# Patient Record
Sex: FEMALE | Race: WHITE | URBAN - METROPOLITAN AREA
[De-identification: names, ages, dates, MRNs, and addresses within clinical notes are randomized per-mention and may not be internally consistent; named-entity substitution may affect disease eponyms.]

---

## 2018-01-23 ENCOUNTER — THERAPY VISIT (OUTPATIENT)
Dept: PHYSICAL THERAPY | Facility: CLINIC | Age: 73
End: 2018-01-23
Payer: COMMERCIAL

## 2018-01-23 DIAGNOSIS — M25.511 RIGHT SHOULDER PAIN, UNSPECIFIED CHRONICITY: Primary | ICD-10-CM

## 2018-01-23 DIAGNOSIS — M19.011 ARTHRITIS OF SHOULDER REGION, RIGHT: ICD-10-CM

## 2018-01-23 PROCEDURE — 97110 THERAPEUTIC EXERCISES: CPT | Mod: GP | Performed by: PHYSICAL THERAPIST

## 2018-01-23 PROCEDURE — 97161 PT EVAL LOW COMPLEX 20 MIN: CPT | Mod: GP | Performed by: PHYSICAL THERAPIST

## 2018-01-23 NOTE — PROGRESS NOTES
[facility] Initial Evaluation -- Upper Extremity    Evaluation Date: January 23, 2018  Layne Persaud is a 72 year old female with a Rt shld condition.   Referral: Ortho  Work mechanical stresses: retired  Employment status:   na  Leisure mechanical stresses: Is doing shld exer at home currently, usually walks but is not currently d/t shld pain  Functional disability score (SPADI): See flowsheet  VAS score (0-10): 5-6/10, ranges 3-10/10  Handedness (R/L):  Rt  Patient goals/expectations:  Progress exer for trip to [place]    HISTORY    Present symptoms: ant shld, Post shld, lateral arm, top of shld.    Pain quality (sharp/shooting/stabbing/aching/burning/cramping):  Sharp, stabbing, throbbing    Present since (onset date):  Summer 2016, exacerbation July 2017. MD referral 1-    Symptoms (improving/unchanging/worsening):  worsening.    Symptoms commenced as a result of: no apparent reason   Condition occurred in the following environment: unknown    Symptoms at onset: same as above  Paresthesia (yes/no):  no  Spinal history: none   Cough/Sneeze (pos/neg):  neg    Constant symptoms: Rt shld  Intermittent symptoms: Rt upper arm    Symptoms are worse with the following: Always Dressing, Always Reaching, Always Sleeping (prone/sup/side R/L) - side R/L and Time of day - Always AM, computer work - sometimes, Turning steering wheel to Lt and buckling seatbelt w/ Rt hand.  Symptoms are better with the following: Other - TNS and ice temporary    Continued use makes the pain (better/worse/no effect): worse    Disturbed night (yes/no):  Yes waking 3+ x/night    Pain at rest (yes/no): yes  Site (neck/shoulder/elbow/wrist/hand): Rt shld    Other questions (swelling/catching/clicking/locking/subluxing):  crepitus    Previous episodes: 2-3 yrs of shld pain  Previous treatments: PT - exer for shld Nov-early Jan 2018 - no change in pain    Specific Questions:  General health (excellent/good/fair/poor):   good  Pertinent medical history includes: Osteoarthritis, Overweight, High blood pressure, Hepatitis, Dizziness/Concussions and Chest pain  Medications (nil/NSAIDS/analg/steroids/anticoag/other):  OTC analgesic and Other - High blood pressure and cholesterol  Medical allergies:  Copper  Imaging (None/Xray/MRI/Other): CT - X-rays - no joint space   Recent or major surgery (yes/no): Rt THR, TKR, spinal fusion, hysterectomy  Night pain (yes/no): no  Accidents (yes/no): fell on [employer] in [facility] Aug 2017  Unexplained weight loss (yes/no): no  Barriers at home: none  Other red flags: none    Sites for physical examination (neck/shoulder/elbow/wrist/hand): Rt shld    EXAMINATION    Posture:  Sitting (good/fair/poor): fair  Correction of posture (better/worse/no effect/NA): NE  Standing (good/fair/poor): fair - incr upper thoracic Kyphosis, protruded head, and rounded shlds.  Other observations:      Neurological (NA/motor/sensory/reflexes/dural):     Baselines (pain or functional activity): Constant pain Rt shld    Extremities (Shoulder/Elbow/Wrist/Hand): Rt shld    Movement Loss Thomas Mod Min Nil Pain   Flexion   150  +   Extension   60     Abduction   142  +   Internal Rotation   To L1     External Rotation  35   +   Supination        Pronation        Radial Deviation        Ulnar Deviation           Passive Movement (+/- overpressure)/(PDM/ERP):  Na d/t time  Resisted Test Response (pain):    Shld Flexion, Abduction and ER 5-/5 (+)   Shld ER, IR, Extension 5/5. (+) w/ ER    Other Tests:     Spine:  Movement Loss: Cerv AROM is WNL except Ext 25% loss.  All movements painfree.  Effect of repeated movements:    Seated Cerv Retr x10 - NE, NE, NE Cerv or Shld ROM.   Seated Cerv Retr w/ OP x10 NE, NE, NE shld pain or cerv ROM, Incr shld ROM,    Seated cerv Ext:  x10 NE, NE, NE shld pain, Incr cerv ROM and Shld ROM.  Effect of static positioning: NA  Spine testing (not relevant/relevant/secondary problem):  Relevant???    Baseline Symptoms: NA  Repeated Tests Symptom Response Mechanical Response   Active/Passive movement, resisted test, functional test During - Produce, Abolish, Increase, Decrease, NE After -   Better, Worse, NB, NW, NE Effect -   ? or ? ROM, strength or key functional test No Effect          Effect of static positioning                  Provisional Classification (Extremity/Spine): Spine - Derangement - Asymmetrical, unilateral, symptoms above elbow      Principle of Management:  Education:  Posture - neutral spine, use of L-roll (pt has one at home from back surgery), affect of posture on neck and shld, centralisation vs peripheralisation.  Equipment provided:  none  Exercise and dosage:  Seated Cerv Retr w/ OP 10x every 2 hours (min 6 x/day) x 2-4 days, then progress to Seated Cerv Ext 10x 6 X/day.    ASSESSMENT/PLAN:    Patient is a 72 year old female with right side shoulder complaints.    Patient has the following significant findings with corresponding treatment plan.                Diagnosis 1:  Rt shld Arthritis/pain  Pain -  manual therapy, education, directional preference exercise and home program  Decreased ROM/flexibility - manual therapy, therapeutic exercise and home program  Decreased strength - therapeutic exercise, therapeutic activities and home program  Decreased function - therapeutic activities and home program  Impaired posture - neuro re-education and home program    Therapy Evaluation Codes:   1) History comprised of:   Personal factors that impact the plan of care:      None.    Comorbidity factors that impact the plan of care are:      High blood pressure, Osteoarthritis and Overweight.     Medications impacting care: High blood pressure, Pain and Cholesterol.  2) Examination of Body Systems comprised of:   Body structures and functions that impact the plan of care:      Cervical spine and Shoulder.   Activity limitations that impact the plan of care are:      Driving,  Dressing, Reading/Computer work and Sleeping.  3) Clinical presentation characteristics are:   Stable/Uncomplicated.  4) Decision-Making    Low complexity using standardized patient assessment instrument and/or measureable assessment of functional outcome.  Cumulative Therapy Evaluation is: Low complexity.    Previous and current functional limitations:  (See Goal Flow Sheet for this information)    Short term and Long term goals: (See Goal Flow Sheet for this information)     Communication ability:  Patient appears to be able to clearly communicate and understand verbal and written communication and follow directions correctly.  Treatment Explanation - The following has been discussed with the patient:   RX ordered/plan of care  Anticipated outcomes  Possible risks and side effects  This patient would benefit from PT intervention to resume normal activities.   Rehab potential is good.    Frequency:  2 X week, once daily  Duration:  for 6 weeks  Discharge Plan:  Achieve all LTG.  Independent in home treatment program.  Reach maximal therapeutic benefit.    Please refer to the daily flowsheet for treatment today, total treatment time and time spent performing 1:1 timed codes.

## 2018-01-23 NOTE — LETTER
[facility] PHYSICAL THERAPY  [address]  [facility] [address]  [phone]    2018    Re: Layne Persaud   :   1945  MRN:  [id number]   REFERRING PHYSICIAN:   Gail Lugo    [facility] PHYSICAL THERAPY    Date of Initial Evaluation:  2018  Visits:  1  Reason for Referral:     Right shoulder pain, unspecified chronicity  Arthritis of shoulder region, right    [facility] Initial Evaluation -- Upper Extremity  Evaluation Date: 2018  Layne Persaud is a 72 year old female with a Rt shld condition.   Referral: Ortho  Work mechanical stresses: retired  Employment status:   na  Leisure mechanical stresses: Is doing shld exer at home currently, usually walks but is not currently d/t shld pain  Functional disability score (SPADI): See flowsheet  VAS score (0-10): 5-6/10, ranges 3-10/10  Handedness (R/L):  Rt  Patient goals/expectations:  Progress exer for trip to [place]    HISTORY  Present symptoms: ant shld, Post shld, lateral arm, top of shld.    Pain quality (sharp/shooting/stabbing/aching/burning/cramping):  Sharp, stabbing, throbbing  Present since (onset date):  Summer 2016, exacerbation 2017. MD referral 2018    Symptoms (improving/unchanging/worsening):  worsening.  Symptoms commenced as a result of: no apparent reason   Condition occurred in the following environment: unknown  Symptoms at onset: same as above    Paresthesia (yes/no):  no  Spinal history: none     Cough/Sneeze (pos/neg):  neg  Constant symptoms: Rt shld  Intermittent symptoms: Rt upper arm  Symptoms are worse with the following: Always Dressing, Always Reaching, Always Sleeping (prone/sup/side R/L) - side R/L and Time of day - Always AM, computer work - sometimes, Turning steering wheel to Lt and buckling seatbelt w/ Rt hand.  Symptoms are better with the following: Other - TNS and ice  temporary  Continued use makes the pain (better/worse/no effect): worse  Disturbed night (yes/no):  Yes waking 3+ x/night    Re: Layne YARI Hung   :   1945    HISTORY (continued)  Pain at rest (yes/no): yes    Site (neck/shoulder/elbow/wrist/hand): Rt shld  Other questions (swelling/catching/clicking/locking/subluxing):  crepitus  Previous episodes: 2-3 yrs of shld pain  Previous treatments: PT - exer for shld Nov-early 2018 - no change in pain    Specific Questions:  General health (excellent/good/fair/poor):  good  Pertinent medical history includes: Osteoarthritis, Overweight, High blood pressure, Hepatitis, Dizziness/Concussions and Chest pain  Medications (nil/NSAIDS/analg/steroids/anticoag/other):  OTC analgesic and Other - High blood pressure and cholesterol  Medical allergies:  Copper  Imaging (None/Xray/MRI/Other): CT - X-rays - no joint space   Recent or major surgery (yes/no): Rt THR, TKR, spinal fusion, hysterectomy  Night pain (yes/no): no  Accidents (yes/no): fell on [employer] in [facility] Aug 2017  Unexplained weight loss (yes/no): no  Barriers at home: none  Other red flags: none    Sites for physical examination (neck/shoulder/elbow/wrist/hand): Rt shld    EXAMINATION    Posture:  Sitting (good/fair/poor): fair  Correction of posture (better/worse/no effect/NA): NE  Standing (good/fair/poor): fair - incr upper thoracic Kyphosis, protruded head, and rounded shlds.  Other observations:    Neurological (NA/motor/sensory/reflexes/dural):   Baselines (pain or functional activity): Constant pain Rt shld  Extremities (Shoulder/Elbow/Wrist/Hand): Rt shld  Movement Loss Thomas Mod Min Nil Pain   Flexion   150  +   Extension   60     Abduction   142  +   Internal Rotation   To L1     External Rotation  35   +   Supination        Pronation        Radial Deviation        Ulnar Deviation        Re: Layne Persaud   :   1945     Passive Movement (+/- overpressure)/(PDM/ERP):  Na  d/t time  Resisted Test Response (pain):    Shld Flexion, Abduction and ER 5-/5 (+)   Shld ER, IR, Extension 5/5. (+) w/ ER    Other Tests:     Spine:  Movement Loss: Cerv AROM is WNL except Ext 25% loss.  All movements painfree.  Effect of repeated movements:    Seated Cerv Retr x10 - NE, NE, NE Cerv or Shld ROM.   Seated Cerv Retr w/ OP x10 NE, NE, NE shld pain or cerv ROM, Incr shld ROM,    Seated cerv Ext:  x10 NE, NE, NE shld pain, Incr cerv ROM and Shld ROM.  Effect of static positioning: NA  Spine testing (not relevant/relevant/secondary problem): Relevant???    Baseline Symptoms: NA  Repeated Tests Symptom Response Mechanical Response   Active/Passive movement, resisted test, functional test During - Produce, Abolish, Increase, Decrease, NE After -   Better, Worse, NB, NW, NE Effect -   ? or ? ROM, strength or key functional test No Effect   Effect of static positioning       Provisional Classification (Extremity/Spine): Spine - Derangement - Asymmetrical, unilateral, symptoms above elbow    Principle of Management:  Education:  Posture - neutral spine, use of L-roll (pt has one at home from back surgery), affect of posture on neck and shld, centralisation vs peripheralisation.  Equipment provided:  none  Exercise and dosage:  Seated Cerv Retr w/ OP 10x every 2 hours (min 6 x/day) x 2-4 days, then progress to Seated Cerv Ext 10x 6 X/day.    ASSESSMENT/PLAN:  Patient is a 72 year old female with right side shoulder complaints.    Patient has the following significant findings with corresponding treatment plan.                Diagnosis 1:  Rt shld Arthritis/pain  Pain -  manual therapy, education, directional preference exercise and home program  Decreased ROM/flexibility - manual therapy, therapeutic exercise and home program  Decreased strength - therapeutic exercise, therapeutic activities and home program  Decreased function - therapeutic activities and home program  Impaired posture - neuro re-education  and home program          Re: Layne Persaud   :   1945    Therapy Evaluation Codes:   1) History comprised of:   Personal factors that impact the plan of care:      None.    Comorbidity factors that impact the plan of care are:      High blood pressure, Osteoarthritis and Overweight.     Medications impacting care: High blood pressure, Pain and Cholesterol.  2) Examination of Body Systems comprised of:   Body structures and functions that impact the plan of care:      Cervical spine and Shoulder.   Activity limitations that impact the plan of care are:      Driving, Dressing, Reading/Computer work and Sleeping.  3) Clinical presentation characteristics are:   Stable/Uncomplicated.  4) Decision-Making    Low complexity using standardized patient assessment instrument and/or measureable assessment of functional outcome.  Cumulative Therapy Evaluation is: Low complexity.    Previous and current functional limitations:  (See Goal Flow Sheet for this information)    Short term and Long term goals: (See Goal Flow Sheet for this information)   Communication ability:  Patient appears to be able to clearly communicate and understand verbal and written communication and follow directions correctly.  Treatment Explanation - The following has been discussed with the patient:   RX ordered/plan of care, Anticipated outcomes, Possible risks and side effects    This patient would benefit from PT intervention to resume normal activities.   Rehab potential is good.  Frequency:  2 X week, once daily  Duration:  for 6 weeks  Discharge Plan:  Achieve all LTG.  Independent in home treatment program.  Reach maximal therapeutic benefit.    Thank you for your referral.    INQUIRIES        Therapist:  Taylor Ashraf, PT, cert MDT  [facility]  [address]   [place] [address]  Phone: [phone]  Fax: [phone]

## 2018-02-08 ENCOUNTER — THERAPY VISIT (OUTPATIENT)
Dept: PHYSICAL THERAPY | Facility: CLINIC | Age: 73
End: 2018-02-08
Payer: COMMERCIAL

## 2018-02-08 DIAGNOSIS — M19.011 ARTHRITIS OF SHOULDER REGION, RIGHT: ICD-10-CM

## 2018-02-08 DIAGNOSIS — M25.511 RIGHT SHOULDER PAIN, UNSPECIFIED CHRONICITY: ICD-10-CM

## 2018-02-08 PROCEDURE — 97110 THERAPEUTIC EXERCISES: CPT | Mod: GP | Performed by: PHYSICAL THERAPIST

## 2018-02-08 PROCEDURE — 97140 MANUAL THERAPY 1/> REGIONS: CPT | Mod: GP | Performed by: PHYSICAL THERAPIST

## 2018-02-08 PROCEDURE — 97112 NEUROMUSCULAR REEDUCATION: CPT | Mod: GP | Performed by: PHYSICAL THERAPIST

## 2018-02-12 ENCOUNTER — THERAPY VISIT (OUTPATIENT)
Dept: PHYSICAL THERAPY | Facility: CLINIC | Age: 73
End: 2018-02-12
Payer: COMMERCIAL

## 2018-02-12 DIAGNOSIS — M19.011 ARTHRITIS OF SHOULDER REGION, RIGHT: ICD-10-CM

## 2018-02-12 DIAGNOSIS — M25.511 RIGHT SHOULDER PAIN, UNSPECIFIED CHRONICITY: ICD-10-CM

## 2018-02-12 PROCEDURE — 97530 THERAPEUTIC ACTIVITIES: CPT | Mod: GP | Performed by: PHYSICAL THERAPIST

## 2018-02-12 PROCEDURE — 97110 THERAPEUTIC EXERCISES: CPT | Mod: GP | Performed by: PHYSICAL THERAPIST

## 2018-02-12 NOTE — LETTER
[facility] PHYSICAL THERAPY  [address]  [facility] [address]  [phone]    February 15, 2018    Re: Layne Persaud   :   1945  MRN:  [id number]   REFERRING PHYSICIAN:   Gail Lugo    [facility] PHYSICAL THERAPY    Date of Initial Evaluation:   2018  Visits:    3  Reason for Referral:     Right shoulder pain, unspecified chronicity  Arthritis of shoulder region, right    DISCHARGE REPORT:  Progress reporting period is from 2018 to 2018.       SUBJECTIVE  Subjective changes noted by patient:  Is having significantly less pain Rt scap area.  Has been having less pain w/ using Rt arm in past week until yesterday.  Yesterday morning had sharp pain Rt shld and has been more painful since.      Current Pain level: 6/10 (since yesterday morning).     Initial Pain level: 6/10 (ranges 3-10/10).   Changes in function:  Minimal change noted in function, but has had decr pain.  Adverse reaction to treatment or activity: None    OBJECTIVE  Changes noted in objective findings:    Cerv AROM is WNL all movements, slight discomfort w/ rotation Rt.    Rt shld AROM:  Flex 160 PDM, Abd 158, ER ++ERP, Ext 70, IR to T10.    Rt shld strength:  Flexion, abduction and ER 5-/5 min-mod pain w/ each and Rt shld Add, IR, and Ext 5/5 w/ mild pain Ext.     ASSESSMENT/PLAN  Updated problem list and treatment plan: Diagnosis 1:  Rt shld Arthritis/pain  Pain -  home program  Decreased ROM/flexibility - home program  Decreased strength - home program  Decreased function - home program  Impaired posture - home program  STG/LTGs have been met or progress has been made towards goals:  minmal functional improvement noted  Assessment of Progress: The patient's condition has improved minimally  Self Management Plans:  Patient is independent in a home treatment program.  Layne continues to require the following intervention to meet STG and LTG's:   PT intervention is no longer required to meet STG/LTG.          Re: Layne Persaud   :   1945    Recommendations:  This patient is ready to be discharged from therapy and continue their home treatment program.  Pt is leaving for a trip to [place] and will be following up w/ MD upon return for [facility].    Thank you for your referral.    INQUIRIES      Therapist: Taylor Ashraf, PT, cert MDT  [facility]ICAL [facility]ERAP[facility]  [address] [facility] [address]  [facility] [address]  Phone: [phone]  Fax: [phone]

## 2018-02-13 NOTE — PROGRESS NOTES
DISCHARGE REPORT    Progress reporting period is from 1- to 2-.       SUBJECTIVE  Subjective changes noted by patient:  Is having significantly less pain Rt scap area.  Has been having less pain w/ using Rt arm in past week until yesterday.  Yesterday morning had sharp pain Rt shld and has been more painful since.      Current Pain level: 6/10 (since yesterday morning).     Initial Pain level: 6/10 (ranges 3-10/10).   Changes in function:  Minimal change noted in function, but has had decr pain.  Adverse reaction to treatment or activity: None    OBJECTIVE  Changes noted in objective findings:    Cerv AROM is WNL all movements, slight discomfort w/ rotation Rt.    Rt shld AROM:  Flex 160 PDM, Abd 158, ER ++ERP, Ext 70, IR to T10.    Rt shld strength:  Flexion, abduction and ER 5-/5 min-mod pain w/ each and Rt shld Add, IR, and Ext 5/5 w/ mild pain Ext.     ASSESSMENT/PLAN  Updated problem list and treatment plan: Diagnosis 1:  Rt shld Arthritis/pain  Pain -  home program  Decreased ROM/flexibility - home program  Decreased strength - home program  Decreased function - home program  Impaired posture - home program  STG/LTGs have been met or progress has been made towards goals:  minmal functional improvement noted  Assessment of Progress: The patient's condition has improved minimally  Self Management Plans:  Patient is independent in a home treatment program.    [place] continues to require the following intervention to meet STG and LTG's:  PT intervention is no longer required to meet STG/LTG.    Recommendations:  This patient is ready to be discharged from therapy and continue their home treatment program.  Pt is leaving for a trip to [place] and will be following up w/ MD upon return for [facility].    Please refer to the daily flowsheet for treatment today, total treatment time and time spent performing 1:1 timed codes.